# Patient Record
Sex: FEMALE | Race: OTHER | HISPANIC OR LATINO
[De-identification: names, ages, dates, MRNs, and addresses within clinical notes are randomized per-mention and may not be internally consistent; named-entity substitution may affect disease eponyms.]

---

## 2023-05-01 PROBLEM — Z00.00 ENCOUNTER FOR PREVENTIVE HEALTH EXAMINATION: Status: ACTIVE | Noted: 2023-05-01

## 2023-05-03 ENCOUNTER — APPOINTMENT (OUTPATIENT)
Dept: OBGYN | Facility: CLINIC | Age: 33
End: 2023-05-03
Payer: COMMERCIAL

## 2023-05-03 ENCOUNTER — OUTPATIENT (OUTPATIENT)
Dept: OUTPATIENT SERVICES | Facility: HOSPITAL | Age: 33
LOS: 1 days | End: 2023-05-03
Payer: COMMERCIAL

## 2023-05-03 VITALS
OXYGEN SATURATION: 96 % | WEIGHT: 143 LBS | DIASTOLIC BLOOD PRESSURE: 62 MMHG | TEMPERATURE: 98.7 F | SYSTOLIC BLOOD PRESSURE: 111 MMHG | HEART RATE: 70 BPM | HEIGHT: 55.12 IN | BODY MASS INDEX: 33.09 KG/M2

## 2023-05-03 DIAGNOSIS — N91.2 AMENORRHEA, UNSPECIFIED: ICD-10-CM

## 2023-05-03 DIAGNOSIS — Z01.419 ENCOUNTER FOR GYNECOLOGICAL EXAMINATION (GENERAL) (ROUTINE) W/OUT ABNORMAL FINDINGS: ICD-10-CM

## 2023-05-03 DIAGNOSIS — N92.6 IRREGULAR MENSTRUATION, UNSPECIFIED: ICD-10-CM

## 2023-05-03 DIAGNOSIS — Z01.419 ENCOUNTER FOR GYNECOLOGICAL EXAMINATION (GENERAL) (ROUTINE) WITHOUT ABNORMAL FINDINGS: ICD-10-CM

## 2023-05-03 PROCEDURE — 87624 HPV HI-RISK TYP POOLED RSLT: CPT

## 2023-05-03 PROCEDURE — 99385 PREV VISIT NEW AGE 18-39: CPT

## 2023-05-03 PROCEDURE — 99396 PREV VISIT EST AGE 40-64: CPT

## 2023-05-03 PROCEDURE — 88142 CYTOPATH C/V THIN LAYER: CPT

## 2023-05-03 PROCEDURE — 81002 URINALYSIS NONAUTO W/O SCOPE: CPT

## 2023-05-03 NOTE — PHYSICAL EXAM
[Chaperone Present] : A chaperone was present in the examining room during all aspects of the physical examination [Appropriately responsive] : appropriately responsive [Alert] : alert [No Acute Distress] : no acute distress [Soft] : soft [Non-tender] : non-tender [Non-distended] : non-distended [No HSM] : No HSM [No Lesions] : no lesions [No Mass] : no mass [Oriented x3] : oriented x3 [Examination Of The Breasts] : a normal appearance [No Masses] : no breast masses were palpable [Labia Majora] : normal [Labia Minora] : normal [Normal] : normal [Uterine Adnexae] : normal [FreeTextEntry1] : april

## 2023-05-03 NOTE — HISTORY OF PRESENT ILLNESS
[HIV test declined] : HIV test declined [Syphilis test declined] : Syphilis test declined [Gonorrhea test declined] : Gonorrhea test declined [Chlamydia test declined] : Chlamydia test declined [Trichomonas test declined] : Trichomonas test declined [HPV test offered] : HPV test offered [Hepatitis B test declined] : Hepatitis B test declined [Hepatitis C test declined] : Hepatitis C test declined [Y] : Patient reports abnormal menses [unknown] : the patient is unsure of the date of her LMP [Irregular Cycle Intervals] : are  irregular [Amenorrhea] : amenorrhea [FreeTextEntry1] : 31yo  LMP 3 months ago, here for annual exam. Reports that her menstrual cycles have always been irregular. She took a upreg at home last week that was neg. Desires another upreg to be sure. She is currently sexually active with 1 male partner. She had a h/o of tubal ligation with her last  delivery. Has not seen a GYN in 3y. \par \par last pap: unsure\par \par OBHx:\par FT  x1\par Ft LTCS x1, NRFHR\par \par GynHx: denies h/o ovarian cysts, fibroids, abnormal pap smears, STIs [PapSmeardate] : unsure [PGHxTotal] : 2 [[facility] : [facility] [[facility] : [facility] [No] : Patient does not have concerns regarding sex

## 2023-05-03 NOTE — DISCUSSION/SUMMARY
[FreeTextEntry1] : 31yo P2 LMP 3 months ago, with irregular menses, here for annual exam.\par \par #annual\par -pap/HPV done today\par \par #irregular menses\par -PCOS labs sent\par -if no menses in 2w will induce menstrual cycle\par -will consider hormonal treatment depending on results of bloodwork\par \par RTC 2w or prn

## 2023-05-04 ENCOUNTER — RESULT CHARGE (OUTPATIENT)
Age: 33
End: 2023-05-04

## 2023-05-04 LAB
HCG UR QL: NEGATIVE
QUALITY CONTROL: YES

## 2023-05-05 DIAGNOSIS — Z01.419 ENCOUNTER FOR GYNECOLOGICAL EXAMINATION (GENERAL) (ROUTINE) WITHOUT ABNORMAL FINDINGS: ICD-10-CM

## 2023-05-05 DIAGNOSIS — N91.2 AMENORRHEA, UNSPECIFIED: ICD-10-CM

## 2023-05-05 DIAGNOSIS — N92.6 IRREGULAR MENSTRUATION, UNSPECIFIED: ICD-10-CM

## 2023-05-05 LAB — HPV HIGH+LOW RISK DNA PNL CVX: NOT DETECTED

## 2023-05-09 LAB — CYTOLOGY CVX/VAG DOC THIN PREP: NORMAL
